# Patient Record
Sex: MALE | Race: WHITE | Employment: FULL TIME | URBAN - METROPOLITAN AREA
[De-identification: names, ages, dates, MRNs, and addresses within clinical notes are randomized per-mention and may not be internally consistent; named-entity substitution may affect disease eponyms.]

---

## 2019-04-05 ENCOUNTER — HOSPITAL ENCOUNTER (OUTPATIENT)
Facility: CLINIC | Age: 79
Discharge: HOME | End: 2019-04-05
Attending: COLON & RECTAL SURGERY | Admitting: COLON & RECTAL SURGERY
Payer: COMMERCIAL

## 2019-04-05 VITALS
HEIGHT: 71 IN | DIASTOLIC BLOOD PRESSURE: 76 MMHG | HEART RATE: 67 BPM | BODY MASS INDEX: 23.8 KG/M2 | OXYGEN SATURATION: 93 % | WEIGHT: 170 LBS | SYSTOLIC BLOOD PRESSURE: 109 MMHG | RESPIRATION RATE: 17 BRPM

## 2019-04-05 LAB — COLONOSCOPY: NORMAL

## 2019-04-05 PROCEDURE — 25000128 H RX IP 250 OP 636: Performed by: COLON & RECTAL SURGERY

## 2019-04-05 PROCEDURE — 45380 COLONOSCOPY AND BIOPSY: CPT | Performed by: COLON & RECTAL SURGERY

## 2019-04-05 PROCEDURE — G0500 MOD SEDAT ENDO SERVICE >5YRS: HCPCS | Performed by: COLON & RECTAL SURGERY

## 2019-04-05 PROCEDURE — 88305 TISSUE EXAM BY PATHOLOGIST: CPT | Performed by: COLON & RECTAL SURGERY

## 2019-04-05 PROCEDURE — 99153 MOD SED SAME PHYS/QHP EA: CPT | Performed by: COLON & RECTAL SURGERY

## 2019-04-05 RX ORDER — ONDANSETRON 4 MG/1
4 TABLET, ORALLY DISINTEGRATING ORAL EVERY 6 HOURS PRN
Status: DISCONTINUED | OUTPATIENT
Start: 2019-04-05 | End: 2019-04-05 | Stop reason: HOSPADM

## 2019-04-05 RX ORDER — ONDANSETRON 2 MG/ML
4 INJECTION INTRAMUSCULAR; INTRAVENOUS EVERY 6 HOURS PRN
Status: DISCONTINUED | OUTPATIENT
Start: 2019-04-05 | End: 2019-04-05 | Stop reason: HOSPADM

## 2019-04-05 RX ORDER — NALOXONE HYDROCHLORIDE 0.4 MG/ML
.1-.4 INJECTION, SOLUTION INTRAMUSCULAR; INTRAVENOUS; SUBCUTANEOUS
Status: DISCONTINUED | OUTPATIENT
Start: 2019-04-05 | End: 2019-04-05 | Stop reason: HOSPADM

## 2019-04-05 RX ORDER — FENTANYL CITRATE 50 UG/ML
INJECTION, SOLUTION INTRAMUSCULAR; INTRAVENOUS PRN
Status: DISCONTINUED | OUTPATIENT
Start: 2019-04-05 | End: 2019-04-05 | Stop reason: HOSPADM

## 2019-04-05 RX ORDER — LIDOCAINE 40 MG/G
CREAM TOPICAL
Status: DISCONTINUED | OUTPATIENT
Start: 2019-04-05 | End: 2019-04-05 | Stop reason: HOSPADM

## 2019-04-05 RX ORDER — ONDANSETRON 2 MG/ML
4 INJECTION INTRAMUSCULAR; INTRAVENOUS
Status: DISCONTINUED | OUTPATIENT
Start: 2019-04-05 | End: 2019-04-05 | Stop reason: HOSPADM

## 2019-04-05 RX ORDER — FLUMAZENIL 0.1 MG/ML
0.2 INJECTION, SOLUTION INTRAVENOUS
Status: DISCONTINUED | OUTPATIENT
Start: 2019-04-05 | End: 2019-04-05 | Stop reason: HOSPADM

## 2019-04-05 ASSESSMENT — MIFFLIN-ST. JEOR: SCORE: 1523.24

## 2019-04-05 NOTE — H&P
Pre-Endoscopy History and Physical     Lan Gutierrez MRN# [id number]   YOB: 1940 Age: 78 year old     Date of Procedure: 4/5/2019  Primary care provider: Carter Hopkins  Type of Endoscopy: Colonoscopy  Reason for Procedure: Change in bowel habits  Type of Anesthesia Anticipated: Moderate Sedation    HPI:    Lan is a 78 year old male who will be undergoing the above procedure.      A history and physical has been performed. The patient's medications and allergies have been reviewed. The risks and benefits of the procedure and the sedation options and risks were discussed with the patient.  All questions were answered and informed consent was obtained.      He denies a personal or family history of anesthesia complications or bleeding disorders.     No Known Allergies       No current facility-administered medications on file prior to encounter.   Current Outpatient Medications on File Prior to Encounter:  ATENOLOL PO Take 12.5 mg by mouth   HYDROCHLOROTHIAZIDE PO Take 12.5 mg by mouth   POTASSIUM CITRATE PO    SIMVASTATIN PO Take 20 mg by mouth   travoprost, KANE Free, (TRAVATAN Z) 0.004 % opthalmic solution 1 drop At Bedtime       There is no problem list on file for this patient.       Past Medical History:   Diagnosis Date     AAA (abdominal aortic aneurysm) (H)      Hypertension      PVC's (premature ventricular contractions)         Past Surgical History:   Procedure Laterality Date     HERNIA REPAIR      right inguinal     ORTHOPEDIC SURGERY         Social History     Tobacco Use     Smoking status: Never Smoker   Substance Use Topics     Alcohol use: Yes     Comment: 10 drinks/week       No family history on file.    REVIEW OF SYSTEMS:     5 point ROS negative except as noted above in HPI, including Gen., Resp., CV, GI &  system review.      PHYSICAL EXAM:   There were no vitals taken for this visit. Estimated body mass index is 23.01 kg/m  as calculated from the following:    Height as of  "6/6/16: 1.803 m (5' 11\").    Weight as of 6/6/16: 74.8 kg (165 lb).   GENERAL APPEARANCE: healthy and alert  MENTAL STATUS: alert  RESP: lungs clear to auscultation - no rales, rhonchi or wheezes  CV: regular rates and rhythm and normal S1 S2, no S3 or S4      IMPRESSION   ASA Class 2 - Mild systemic disease        PLAN:     Plan for colonoscopy. We discussed the risks, benefits and alternatives and the patient wished to proceed.    The above has been forwarded to the consulting provider.      Khai Mccarty MD  [facility]  Phone: [phone]  April 5, 2019    "

## 2019-04-08 LAB — COPATH REPORT: NORMAL

## 2021-10-21 DIAGNOSIS — Z11.59 ENCOUNTER FOR SCREENING FOR OTHER VIRAL DISEASES: ICD-10-CM

## 2021-10-25 ENCOUNTER — LAB (OUTPATIENT)
Dept: LAB | Facility: CLINIC | Age: 81
End: 2021-10-25
Attending: COLON & RECTAL SURGERY
Payer: COMMERCIAL

## 2021-10-25 DIAGNOSIS — Z11.59 ENCOUNTER FOR SCREENING FOR OTHER VIRAL DISEASES: ICD-10-CM

## 2021-10-25 PROCEDURE — U0005 INFEC AGEN DETEC AMPLI PROBE: HCPCS

## 2021-10-25 PROCEDURE — U0003 INFECTIOUS AGENT DETECTION BY NUCLEIC ACID (DNA OR RNA); SEVERE ACUTE RESPIRATORY SYNDROME CORONAVIRUS 2 (SARS-COV-2) (CORONAVIRUS DISEASE [COVID-19]), AMPLIFIED PROBE TECHNIQUE, MAKING USE OF HIGH THROUGHPUT TECHNOLOGIES AS DESCRIBED BY CMS-2020-01-R: HCPCS

## 2021-10-26 LAB — SARS-COV-2 RNA RESP QL NAA+PROBE: NEGATIVE

## 2021-10-28 ENCOUNTER — HOSPITAL ENCOUNTER (OUTPATIENT)
Facility: CLINIC | Age: 81
Discharge: HOME | End: 2021-10-28
Attending: COLON & RECTAL SURGERY | Admitting: COLON & RECTAL SURGERY
Payer: COMMERCIAL

## 2021-10-28 VITALS
OXYGEN SATURATION: 99 % | SYSTOLIC BLOOD PRESSURE: 132 MMHG | HEART RATE: 75 BPM | DIASTOLIC BLOOD PRESSURE: 93 MMHG | RESPIRATION RATE: 16 BRPM

## 2021-10-28 LAB — COLONOSCOPY: NORMAL

## 2021-10-28 PROCEDURE — 99153 MOD SED SAME PHYS/QHP EA: CPT | Performed by: COLON & RECTAL SURGERY

## 2021-10-28 PROCEDURE — 45380 COLONOSCOPY AND BIOPSY: CPT | Performed by: COLON & RECTAL SURGERY

## 2021-10-28 PROCEDURE — 250N000011 HC RX IP 250 OP 636: Performed by: COLON & RECTAL SURGERY

## 2021-10-28 PROCEDURE — G0500 MOD SEDAT ENDO SERVICE >5YRS: HCPCS | Performed by: COLON & RECTAL SURGERY

## 2021-10-28 PROCEDURE — 88305 TISSUE EXAM BY PATHOLOGIST: CPT | Mod: TC | Performed by: COLON & RECTAL SURGERY

## 2021-10-28 RX ORDER — FENTANYL CITRATE 50 UG/ML
INJECTION, SOLUTION INTRAMUSCULAR; INTRAVENOUS PRN
Status: COMPLETED | OUTPATIENT
Start: 2021-10-28 | End: 2021-10-28

## 2021-10-28 RX ORDER — SIMVASTATIN 40 MG
TABLET ORAL
COMMUNITY
Start: 2021-08-06

## 2021-10-28 RX ORDER — LISINOPRIL 10 MG/1
10 TABLET ORAL
COMMUNITY
Start: 2020-02-12

## 2021-10-28 RX ADMIN — FENTANYL CITRATE 100 MCG: 50 INJECTION, SOLUTION INTRAMUSCULAR; INTRAVENOUS at 08:13

## 2021-10-28 RX ADMIN — MIDAZOLAM 1 MG: 1 INJECTION INTRAMUSCULAR; INTRAVENOUS at 08:13

## 2021-10-28 RX ADMIN — MIDAZOLAM 1 MG: 1 INJECTION INTRAMUSCULAR; INTRAVENOUS at 08:18

## 2021-10-28 NOTE — H&P
Pre-Endoscopy History and Physical     Lan Gutierrez MRN# [id number]   YOB: 1940 Age: 81 year old     Date of Procedure: 10/28/2021  Primary care provider: Carter Hopkins  Type of Endoscopy: colonoscopy  Reason for Procedure: surveillance, diarrhea  Type of Anesthesia Anticipated: Moderate Sedation    HPI:    Lan is a 81 year old male who will be undergoing the above procedure.      A history and physical has been performed. The patient's medications and allergies have been reviewed. The risks and benefits of the procedure including the risk of bleeding, perforation, and missed lesions as well as the sedation options and risks were discussed with the patient.  All questions were answered and informed consent was obtained.      No Known Allergies     No current facility-administered medications for this encounter.       Medications Prior to Admission   Medication Sig Dispense Refill Last Dose     lisinopril (ZESTRIL) 10 MG tablet Take 10 mg by mouth   10/27/2021 at Unknown time     Multiple Vitamins-Minerals (MULTIVITAMIN ADULT PO)    10/27/2021 at Unknown time     simvastatin (ZOCOR) 40 MG tablet    10/27/2021 at Unknown time     travoprost, KANE Free, (TRAVATAN Z) 0.004 % opthalmic solution 1 drop At Bedtime   10/27/2021 at Unknown time     SIMVASTATIN PO Take 20 mg by mouth          There is no problem list on file for this patient.       Past Medical History:   Diagnosis Date     AAA (abdominal aortic aneurysm) (H)      Hypertension      PVC's (premature ventricular contractions)         Past Surgical History:   Procedure Laterality Date     COLONOSCOPY N/A 4/5/2019    Procedure: COMBINED COLONOSCOPY, SINGLE OR MULTIPLE BIOPSY/POLYPECTOMY BY BIOPSY;  Surgeon: Khai Mccarty MD;  Location: [facility]     HERNIA REPAIR      right inguinal     ORTHOPEDIC SURGERY         Social History     Tobacco Use     Smoking status: Never Smoker     Smokeless tobacco: Never Used   Substance Use Topics     Alcohol  "use: Yes     Comment: 10 drinks/week       No family history on file.      PHYSICAL EXAM:   There were no vitals taken for this visit. Estimated body mass index is 23.71 kg/m  as calculated from the following:    Height as of 4/5/19: 1.803 m (5' 11\").    Weight as of 4/5/19: 77.1 kg (170 lb).   Mental status - alert and oriented  RESP: lungs clear  CV: RRR  AIRWAY EXAM: Mallampatti Class II (visualization of the soft palate, fauces, and uvula)    IMPRESSION   ASA Class 3 - Severe systemic disease, but not incapacitating      Signed Electronically by: Bc Shaw MD  October 28, 2021    Colorectal Surgery  [phone] (office)  [phone] (pager)  [email or web address]          "

## 2021-10-29 LAB
PATH REPORT.COMMENTS IMP SPEC: NORMAL
PATH REPORT.FINAL DX SPEC: NORMAL
PATH REPORT.GROSS SPEC: NORMAL
PATH REPORT.MICROSCOPIC SPEC OTHER STN: NORMAL
PATH REPORT.RELEVANT HX SPEC: NORMAL
PHOTO IMAGE: NORMAL

## 2021-10-29 PROCEDURE — 88305 TISSUE EXAM BY PATHOLOGIST: CPT | Mod: 26 | Performed by: PATHOLOGY

## (undated) RX ORDER — FENTANYL CITRATE 50 UG/ML
INJECTION, SOLUTION INTRAMUSCULAR; INTRAVENOUS
Status: DISPENSED
Start: 2021-10-28

## (undated) RX ORDER — FENTANYL CITRATE 50 UG/ML
INJECTION, SOLUTION INTRAMUSCULAR; INTRAVENOUS
Status: DISPENSED
Start: 2019-04-05